# Patient Record
Sex: MALE | Race: BLACK OR AFRICAN AMERICAN
[De-identification: names, ages, dates, MRNs, and addresses within clinical notes are randomized per-mention and may not be internally consistent; named-entity substitution may affect disease eponyms.]

---

## 2018-01-11 ENCOUNTER — HOSPITAL ENCOUNTER (EMERGENCY)
Dept: HOSPITAL 61 - ER | Age: 48
Discharge: HOME | End: 2018-01-11
Payer: COMMERCIAL
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
## 2018-01-11 DIAGNOSIS — S46.212A: Primary | ICD-10-CM

## 2018-01-11 DIAGNOSIS — F32.9: ICD-10-CM

## 2018-01-11 PROCEDURE — 73060 X-RAY EXAM OF HUMERUS: CPT

## 2018-01-11 PROCEDURE — 99284 EMERGENCY DEPT VISIT MOD MDM: CPT

## 2018-01-11 RX ADMIN — IBUPROFEN 1 MG: 800 TABLET ORAL at 12:57

## 2018-01-19 ENCOUNTER — HOSPITAL ENCOUNTER (EMERGENCY)
Dept: HOSPITAL 61 - ER | Age: 48
Discharge: HOME | End: 2018-01-19
Payer: COMMERCIAL
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
## 2018-01-19 DIAGNOSIS — S49.91XA: Primary | ICD-10-CM
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
## 2018-01-19 PROCEDURE — 99284 EMERGENCY DEPT VISIT MOD MDM: CPT

## 2018-01-19 PROCEDURE — 73030 X-RAY EXAM OF SHOULDER: CPT

## 2018-01-19 RX ADMIN — IBUPROFEN 1 MG: 800 TABLET ORAL at 15:25

## 2019-02-25 ENCOUNTER — HOSPITAL ENCOUNTER (EMERGENCY)
Dept: HOSPITAL 61 - ER | Age: 49
Discharge: HOME | End: 2019-02-25
Payer: COMMERCIAL

## 2019-02-25 VITALS — SYSTOLIC BLOOD PRESSURE: 156 MMHG | DIASTOLIC BLOOD PRESSURE: 102 MMHG

## 2019-02-25 VITALS — WEIGHT: 235 LBS | HEIGHT: 69 IN | BODY MASS INDEX: 34.8 KG/M2
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
## 2019-02-25 DIAGNOSIS — S29.012A: Primary | ICD-10-CM
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
## 2019-02-25 LAB
APTT PPP: YELLOW S
BACTERIA #/AREA URNS HPF: 0 /HPF
BILIRUB UR QL STRIP: NEGATIVE
FIBRINOGEN PPP-MCNC: CLEAR MG/DL
NITRITE UR QL STRIP: NEGATIVE
PH UR STRIP: 5.5 [PH]
PROT UR STRIP-MCNC: NEGATIVE MG/DL
RBC #/AREA URNS HPF: (no result) /HPF (ref 0–2)
SQUAMOUS #/AREA URNS LPF: (no result) /LPF
UROBILINOGEN UR-MCNC: 0.2 MG/DL
WBC #/AREA URNS HPF: 0 /HPF (ref 0–4)

## 2019-02-25 PROCEDURE — 71046 X-RAY EXAM CHEST 2 VIEWS: CPT

## 2019-02-25 PROCEDURE — 81001 URINALYSIS AUTO W/SCOPE: CPT

## 2019-02-25 PROCEDURE — 99283 EMERGENCY DEPT VISIT LOW MDM: CPT

## 2019-02-25 PROCEDURE — 99284 EMERGENCY DEPT VISIT MOD MDM: CPT

## 2019-02-25 PROCEDURE — 96372 THER/PROPH/DIAG INJ SC/IM: CPT

## 2019-02-25 NOTE — RAD
CHEST PA   LATERAL

 

History: rt lower rib pain, no injury 

 

Comparison: None.

 

Findings: 

The cardiomediastinal silhouette is normal. Pulmonary vasculature is 

normal. The lungs are clear. No pleural effusion or pneumothorax is seen. 

There is no acute bone abnormality. 

 

IMPRESSION: 

No acute cardiopulmonary process. 

 

 

Electronically signed by: Neri Maher MD (2/25/2019 10:25 AM) [id number]

## 2019-02-25 NOTE — PHYS DOC
Past Medical History


Past Medical History:  Depression


Additional Past Medical Histor:  unknown "psych" dx,


Past Surgical History:  No Surgical History


Alcohol Use:  None


Drug Use:  Cocaine





Adult General


Chief Complaint


Chief Complaint:  FLANK PAIN





HPI


HPI





48-year-old male presents to ER via POV for complaints of right mid back pain 

for the past 3 days. Patient denies any recent falls or injury. Patient denies 

pain radiating to any other area. Patient denies urinary symptoms, fever or 

chills, flu like illness, SOA, or CP. Pt reports he is rt hand dominant and is 

a - working last night. Pt reports pain is constant and aching/tight 

in nature. He reports pain increases with repositioning/movements and palp. of 

area. He denies swelling/numbness or tingling. He denies recent travel.





Pt is daily smoker and drinks approx. 6 pk of beer daily- denies any etoh 

intake today.





Review of Systems


Review of Systems





Constitutional: Denies fever or chills. Denies weakness


Eyes: Denies change in visual acuity, redness, or eye pain []


HENT: Denies nasal congestion or sore throat []


Respiratory: Denies cough or shortness of breath []


Cardiovascular: Denies CP


GI: Denies abdominal pain, nausea, vomiting, bloody stools or diarrhea []


: Denies dysuria or hematuria. Denies incontinence of bowel/bladder


Musculoskeletal: Denies neck pain or joint pain. Reports rt side mid back pain- 

denies radiation of pain to extremities/lower back


Integument: Denies rash or skin lesions []


Neurologic: Denies headache, focal weakness or sensory changes []





All other systems were reviewed and found to be within normal limits, except as 

documented in this note.





Current Medications


Current Medications





Current Medications








 Medications


  (Trade)  Dose


 Ordered  Sig/Mary Beth  Start Time


 Stop Time Status Last Admin


Dose Admin


 


 Ketorolac


 Tromethamine


  (Toradol Im)  30 mg  1X  ONCE  2/25/19 09:15


 2/25/19 09:16 DC 2/25/19 09:29


30 MG


 


 Lidocaine


  (Lidoderm)  1 patch  1X  ONCE  2/25/19 09:15


 2/25/19 09:16 DC 2/25/19 09:28


1 PATCH


 


 Methocarbamol


  (Robaxin)  750 mg  1X  ONCE  2/25/19 09:15


 2/25/19 09:16 DC 2/25/19 09:28


750 MG











Allergies


Allergies





Allergies








Coded Allergies Type Severity Reaction Last Updated Verified


 


  No Known Drug Allergies    4/3/16 No











Physical Exam


Physical Exam





Constitutional: Well developed, well nourished, no acute distress, non-toxic 

appearance. Steady unassisted gait


HENT: Normocephalic, atraumatic, oropharynx moist, nose normal. []


Eyes: Pupils equal, conjunctiva normal, no discharge. [] 


Neck: Normal range of motion, no tenderness, supple, no stridor. [] 


Cardiovascular: Heart rate regular rhythm, no murmur []


Lungs & Thorax:  Bilateral breath sounds clear to auscultation. Resp. equal/

nonlabored


Abdomen: Bowel sounds normal, soft, no tenderness


Skin: Warm, dry, no erythema, no rash. [] 


Back: Tender on palp. mid rt side back- no crepitus/visible injury/swelling- pt 

has facial grimacing with repositioning and with palp. of area, no CVA 

tenderness. [] 


Extremities: No tenderness, no cyanosis, no clubbing, ROM intact, no edema. 2+ 

radial bilat. 


Neurologic: Alert and oriented X 3, normal motor function, normal sensory 

function, no focal deficits noted. []


Psychologic: Affect normal, judgement normal, mood normal. []





Current Patient Data


Vital Signs





 Vital Signs








  Date Time  Temp Pulse Resp B/P (MAP) Pulse Ox O2 Delivery O2 Flow Rate FiO2


 


2/25/19 08:57 97.6 64 18 149/99 (116) 98 Room Air  





 97.6       








Lab Values





 Laboratory Tests








Test


 2/25/19


08:58


 


Urine Collection Type Void  


 


Urine Color Yellow  


 


Urine Clarity Clear  


 


Urine pH 5.5  


 


Urine Specific Gravity 1.020  


 


Urine Protein


 Negative mg/dL


(NEG-TRACE)


 


Urine Glucose (UA)


 Negative mg/dL


(NEG)


 


Urine Ketones (Stick)


 Negative mg/dL


(NEG)


 


Urine Blood


 Negative (NEG)





 


Urine Nitrite


 Negative (NEG)





 


Urine Bilirubin


 Negative (NEG)





 


Urine Urobilinogen Dipstick


 0.2 mg/dL (0.2


mg/dL)


 


Urine Leukocyte Esterase


 Negative (NEG)





 


Urine RBC


 Occ /HPF (0-2)





 


Urine WBC 0 /HPF (0-4)  


 


Urine Squamous Epithelial


Cells None /LPF  





 


Urine Bacteria


 0 /HPF (0-FEW)





 


Urine Mucus Slight /LPF  











EKG


EKG


[]





Radiology/Procedures


Radiology/Procedures


PROCEDURE: CHEST PA & LATERAL





CHEST PA   LATERAL


 


History: rt lower rib pain, no injury 


 


Comparison: None.


 


Findings: 


The cardiomediastinal silhouette is normal. Pulmonary vasculature is 


normal. The lungs are clear. No pleural effusion or pneumothorax is seen. 


There is no acute bone abnormality. 


 


IMPRESSION: 


No acute cardiopulmonary process. 


 


 


Electronically signed by: Neri Gonzalez MD (2/25/2019 10:25 AM) [id number]














DICTATED and SIGNED BY:     NERI GONZALEZ MD


DATE:     02/25/19 1021





Course & Med Decision Making


Course & Med Decision Making


Pertinent Labs and Imaging studies reviewed. (See chart for details)





Patient was evaluated in the ER for complaints of right mid back pain which has 

been ongoing for 3 days- denying any injury. Patient had chest x-ray which was 

negative for acute findings and UA negative for infection, blood, ketones. 

Patient was treated with dose of Toradol and oral Robaxin. Patient had Lidoderm 

patch applied to focal area of tenderness on the right side mid back. Patient 

reports he has had some improvement in pain. He remains PMS intact all 

extremities with full range of motion and steady unassisted gait. Pt reported 

he been off of his blood pressure medicine for quite a while and so discussion 

had with patient regarding follow-up with primary care physician for recheck of 

blood pressure and if symptoms persist. BP was 149/99 while in the ER. Patient 

will be provided with community clinic and physician referral information for 

follow-up purposes. Will provide prescription for Robaxin patient educated on 

medication. Discharge instructions were discussed and education provided on 

signs and symptoms to return to ER for. Patient was advised not to drink 

alcohol if taking Robaxin prescription as well as not to drive. At time of 

discharge discussion patient was in no visible distress.





Dragon Disclaimer


Dragon Disclaimer


This electronic medical record was generated, in whole or in part, using a 

voice recognition dictation system.





Departure


Departure


Impression:  


 Primary Impression:  


 Muscle strain of right upper back


 Additional Impression:  


 Musculoskeletal back pain


Disposition:  01 HOME, SELF-CARE


Condition:  STABLE


Referrals:  


NO PCP (PCP)


Patient Instructions:  Muscle Strain, Musculoskeletal Pain





Additional Instructions:  


Ibuprofen and/or Tylenol as needed for pain as directed on container. Sports 

cream as directed on container as needed. Epsom salt soaks as directed on 

container may also help with pain. Remove the lidocaine patch applied in the ER 

in 12 hours- over the counter patches can be used as directed on container. 





Ice and heat compress to affected area every 3-4 hours every 20-30 minutes.





If taking Robaxin prescription no driving or drinking alcohol.





If symptoms persist follow-up with primary doctor for re-evaluation.





You should have your blood pressure rechecked by your doctor as you have been 

off of your blood pressure medication for awhile. Your blood pressure was 149/

99 while in the ER.


Scripts


Methocarbamol (ROBAXIN-750) 750 Mg Tablet


1 TAB PO BID PRN for PAIN, #8 TAB 0 Refills


   No drinking alcohol or driving if taking this medication


   Prov: ALEXANDRIA MOSS         2/25/19





Problem Qualifiers











ALEXANDRIA MOSS Feb 25, 2019 09:15

## 2019-04-22 ENCOUNTER — HOSPITAL ENCOUNTER (EMERGENCY)
Dept: HOSPITAL 61 - ER | Age: 49
Discharge: HOME | End: 2019-04-22
Payer: COMMERCIAL

## 2019-04-22 VITALS — WEIGHT: 235 LBS | BODY MASS INDEX: 34.8 KG/M2 | HEIGHT: 69 IN

## 2019-04-22 VITALS — DIASTOLIC BLOOD PRESSURE: 93 MMHG | SYSTOLIC BLOOD PRESSURE: 141 MMHG

## 2019-04-22 DIAGNOSIS — M71.22: ICD-10-CM
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
## 2019-04-22 DIAGNOSIS — M25.462: ICD-10-CM
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
## 2019-04-22 DIAGNOSIS — S80.12XA: Primary | ICD-10-CM

## 2019-04-22 DIAGNOSIS — F10.20: ICD-10-CM

## 2019-04-22 DIAGNOSIS — I10: ICD-10-CM
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
## 2019-04-22 LAB
ANION GAP SERPL CALC-SCNC: 12 MMOL/L (ref 6–14)
APTT BLD: 31 SEC (ref 24–38)
BASOPHILS # BLD AUTO: 0.1 X10^3/UL (ref 0–0.2)
BASOPHILS NFR BLD: 1 % (ref 0–3)
BUN SERPL-MCNC: 21 MG/DL (ref 8–26)
CALCIUM SERPL-MCNC: 9.2 MG/DL (ref 8.5–10.1)
CHLORIDE SERPL-SCNC: 103 MMOL/L (ref 98–107)
CK SERPL-CCNC: 297 U/L (ref 39–308)
CO2 SERPL-SCNC: 22 MMOL/L (ref 21–32)
CREAT SERPL-MCNC: 1.5 MG/DL (ref 0.7–1.3)
EOSINOPHIL NFR BLD: 0.2 X10^3/UL (ref 0–0.7)
EOSINOPHIL NFR BLD: 2 % (ref 0–3)
ERYTHROCYTE [DISTWIDTH] IN BLOOD BY AUTOMATED COUNT: 14.1 % (ref 11.5–14.5)
GFR SERPLBLD BASED ON 1.73 SQ M-ARVRAT: 60.2 ML/MIN
GLUCOSE SERPL-MCNC: 105 MG/DL (ref 70–99)
HCT VFR BLD CALC: 38 % (ref 39–53)
HGB BLD-MCNC: 12.7 G/DL (ref 13–17.5)
LYMPHOCYTES # BLD: 3.1 X10^3/UL (ref 1–4.8)
LYMPHOCYTES NFR BLD AUTO: 35 % (ref 24–48)
MCH RBC QN AUTO: 30 PG (ref 25–35)
MCHC RBC AUTO-ENTMCNC: 33 G/DL (ref 31–37)
MCV RBC AUTO: 91 FL (ref 79–100)
MONO #: 1 X10^3/UL (ref 0–1.1)
MONOCYTES NFR BLD: 12 % (ref 0–9)
NEUT #: 4.4 X10^3UL (ref 1.8–7.7)
NEUTROPHILS NFR BLD AUTO: 50 % (ref 31–73)
PLATELET # BLD AUTO: 252 X10^3/UL (ref 140–400)
POTASSIUM SERPL-SCNC: 3.7 MMOL/L (ref 3.5–5.1)
PROTHROMBIN TIME: 13.2 SEC (ref 11.7–14)
RBC # BLD AUTO: 4.18 X10^6/UL (ref 4.3–5.7)
SODIUM SERPL-SCNC: 137 MMOL/L (ref 136–145)
WBC # BLD AUTO: 8.8 X10^3/UL (ref 4–11)

## 2019-04-22 PROCEDURE — 85610 PROTHROMBIN TIME: CPT

## 2019-04-22 PROCEDURE — 85025 COMPLETE CBC W/AUTO DIFF WBC: CPT

## 2019-04-22 PROCEDURE — 73700 CT LOWER EXTREMITY W/O DYE: CPT

## 2019-04-22 PROCEDURE — 36415 COLL VENOUS BLD VENIPUNCTURE: CPT

## 2019-04-22 PROCEDURE — 80048 BASIC METABOLIC PNL TOTAL CA: CPT

## 2019-04-22 PROCEDURE — 93971 EXTREMITY STUDY: CPT

## 2019-04-22 PROCEDURE — 85730 THROMBOPLASTIN TIME PARTIAL: CPT

## 2019-04-22 PROCEDURE — 82550 ASSAY OF CK (CPK): CPT

## 2019-04-22 NOTE — RAD
Left leg venous Doppler study:

 

Clinical indications: Left calf swelling and pain.

 

Findings: Duplex sonography (including gray scale evaluation and color 

flow and waveform spectral analysis) of the proximal aspect of the greater

saphenous vein and the proximal aspect of the profunda femoral vein and 

the entire length of the common femoral and superficial femoral and 

popliteal veins and the tibioperoneal trunk and the proximal aspect of the

posterior tibial and peroneal veins of the left leg was performed. Normal 

compressibility, augmentation of color Doppler flow after calf 

compression, and respiratory variation of Doppler flow is seen. Thus, 

there are no sonographic findings of deep venous thrombosis within these 

veins.

 

Impression: There are no sonographic findings of deep venous thrombosis 

within the veins discussed above of the left lower extremity.

 

There is a large complex heterogeneous cystic and solid lesion within the 

popliteal fossa extending into the proximal calf measuring 13 cm x 4.6 cm 

x 1.9 cm in size. This is avascular and could represent a hematoma.

 

Electronically signed by: Jaison Lang MD (4/22/2019 4:54 PM) [id number]

## 2019-04-22 NOTE — PHYS DOC
Past Medical History


Past Medical History:  Depression, Hypertension, Other


Additional Past Medical Histor:  unknown "psych" dx,


Past Surgical History:  No Surgical History


Alcohol Use:  Heavy


Drug Use:  Cocaine





Adult General


Chief Complaint


Chief Complaint:  LOWER EXTREMITY SWELLING





HPI


HPI





Patient is a 48  year old male presents to the ED complaining of left lower leg 

swelling 4 days ago. Patient states that he feels like he was bit by something.

States he thinks it was on the back of his calf but states no bite marks. States

he started to notice swelling 4 days ago. Describes the pain as achy. Rates the 

pain as 6 out of 10. Denies injury, erythema, redness, warmth, fever, 

nausea/vomiting, chest pain, shortness of breath, paresthesias, weakness, 

dizziness or recent travel.





Review of Systems


Review of Systems





Constitutional: Denies fever or chills []


Eyes: Denies change in visual acuity, redness, or eye pain []


HENT: Denies nasal congestion or sore throat []


Respiratory: Denies cough or shortness of breath []


Cardiovascular: No additional information not addressed in HPI []


GI: Denies abdominal pain, nausea, vomiting, bloody stools or diarrhea []


: Denies dysuria or hematuria []


Musculoskeletal: Complains of left calf pain. Denies back pain.


Integument: Denies rash or skin lesions []


Neurologic: Denies headache, focal weakness or sensory changes []





All other systems were reviewed and found to be within normal limits, except as 

documented in this note.





Allergies


Allergies





Allergies








Coded Allergies Type Severity Reaction Last Updated Verified


 


  No Known Drug Allergies    4/3/16 No











Physical Exam


Physical Exam





Constitutional: Well developed, well nourished, no acute distress, non-toxic 

appearance. []


HENT: Normocephalic, atraumatic


Cardiovascular:Heart rate regular rhythm, no murmur []


Lungs & Thorax:  Bilateral breath sounds clear to auscultation []


Abdomen: Bowel sounds normal, soft, no tenderness, no masses, no pulsatile 

masses. [] 


Skin: Warm, dry, no erythema, no rash. [] 


Back: No tenderness, no CVA tenderness. [] 


Extremities: mild left calf tenderness/swelling, No overlying skin changes. no 

cyanosis, no clubbing, ROM intact, no edema. [] 


Neurologic: Alert and oriented X 3, normal motor function, normal sensory 

function, no focal deficits noted. []


Psychologic: Affect normal, judgement normal, mood normal. []





Current Patient Data


Vital Signs





                                   Vital Signs








  Date Time  Temp Pulse Resp B/P (MAP) Pulse Ox O2 Delivery O2 Flow Rate FiO2


 


4/22/19 16:19 97.8 79 18 139/93 (108) 98 Room Air  





 97.8       











EKG


EKG


[]





Radiology/Procedures


Radiology/Procedures


PROCEDURE: VENOUS LOWER EXTREMITY LEFT





 


Left leg venous Doppler study:


 


Clinical indications: Left calf swelling and pain.


 


Findings: Duplex sonography (including gray scale evaluation and color 


flow and waveform spectral analysis) of the proximal aspect of the greater


saphenous vein and the proximal aspect of the profunda femoral vein and 


the entire length of the common femoral and superficial femoral and 


popliteal veins and the tibioperoneal trunk and the proximal aspect of the


posterior tibial and peroneal veins of the left leg was performed. Normal 


compressibility, augmentation of color Doppler flow after calf 


compression, and respiratory variation of Doppler flow is seen. Thus, 


there are no sonographic findings of deep venous thrombosis within these 


veins.


 


Impression: There are no sonographic findings of deep venous thrombosis 


within the veins discussed above of the left lower extremity.


 


There is a large complex heterogeneous cystic and solid lesion within the 


popliteal fossa extending into the proximal calf measuring 13 cm x 4.6 cm 


x 1.9 cm in size. This is avascular and could represent a hematoma.[]





Course & Med Decision Making


Course & Med Decision Making


Pertinent Labs and Imaging studies reviewed. (See chart for details)





[]Discussed US findings with patient. Lab and additional imaging ordered and 

pending. Transfer of care to LAURA Robison. Will await pending labs/imaging

 and disposition patient.





Dragon Disclaimer


Dragon Disclaimer


This electronic medical record was generated, in whole or in part, using a voice

 recognition dictation system.





Departure


Departure


Impression:  


   Primary Impression:  


   Spontaneous hematoma of lower leg


Referrals:  


NO PCP (PCP)











NGOZI GILLIAM        Apr 22, 2019 16:14

## 2019-04-22 NOTE — RAD
CT left lower extremity without contrast

 

HISTORY: Large hematoma

 

Axial helical images obtained of the left lower extremity from the distal 

thigh through the foot without IV contrast. Axial coronal and sagittal 

reconstruction was performed.

 

There is a moderate joint effusion. There is a complex Baker's cyst that 

measures 6.3 x 6.9 x 2.7 cm and contains multiple noncalcified loose 

bodies.

 

There is diffuse soft tissue edema. There is no other fluid collection.

 

IMPRESSION:

 

1. Large complex popliteal cyst which appears to be a Baker's cyst. There 

are several loose bodies and organizing hemorrhage is possible.

2. Moderate knee effusion.

 

Electronically signed by: Gallo Bose III, MD (4/22/2019 5:42 PM) [facility]

## 2019-08-05 ENCOUNTER — HOSPITAL ENCOUNTER (EMERGENCY)
Dept: HOSPITAL 61 - ER | Age: 49
Discharge: HOME | End: 2019-08-05
Payer: COMMERCIAL

## 2019-08-05 VITALS — WEIGHT: 240 LBS | BODY MASS INDEX: 35.55 KG/M2 | HEIGHT: 69 IN

## 2019-08-05 VITALS — SYSTOLIC BLOOD PRESSURE: 152 MMHG | DIASTOLIC BLOOD PRESSURE: 92 MMHG

## 2019-08-05 DIAGNOSIS — S61.011A: Primary | ICD-10-CM
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
## 2019-08-05 DIAGNOSIS — I10: ICD-10-CM

## 2019-08-05 DIAGNOSIS — F10.20: ICD-10-CM
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
## 2019-08-05 PROCEDURE — 99283 EMERGENCY DEPT VISIT LOW MDM: CPT

## 2019-08-05 PROCEDURE — 90715 TDAP VACCINE 7 YRS/> IM: CPT

## 2019-08-05 PROCEDURE — 90471 IMMUNIZATION ADMIN: CPT

## 2019-08-05 NOTE — PHYS DOC
Past Medical History


Past Medical History:  Depression, Hypertension, Other


Additional Past Medical Histor:  unknown "psych" dx,


Past Surgical History:  No Surgical History


Alcohol Use:  Heavy


Drug Use:  Cocaine





Adult General


Chief Complaint


Chief Complaint:  LACERATION/AVULSION





HPI


HPI





Patient is a 48  year old since emergency department with complaints of a 

laceration to his right thumb. Patient states he was working at [employer] using a  to slice some brisk in the accidentally sliced the 

tip of his right thumb. Unsure when his last tetanus shot was. He currently 

rates the pain 7 out of 10 on the pain scale, he denies any alleviating factors.

The pain increases when he moves or touches the area.





ROS


Patient denies any fever, cough, nausea, vomiting, diarrhea, headache, numbness,

tingling, or weakness of the affected extremity. All other ROS is neg unless 

otherwise noted in HPI.





Review of Systems


Review of Systems


See Above





Current Medications


Current Medications





Current Medications








 Medications


  (Trade)  Dose


 Ordered  Sig/Mary Beth  Start Time


 Stop Time Status Last Admin


Dose Admin


 


 Acetaminophen/


 Hydrocodone Bitart


  (Lortab 5/325)  1 tab  1X  ONCE  8/5/19 16:15


 8/5/19 16:15 DC 8/5/19 16:11


1 TAB


 


 Cellulose


  (Surgicel


 Hemostat 4x8)  1 each  1X  ONCE  8/5/19 15:45


 8/5/19 15:46 DC 8/5/19 15:18


1 EACH


 


 Diphtheria/


 Tetanus/Acell


 Pertussis


  (Boostrix)  0.5 ml  ONCE ONCE  8/5/19 15:15


 8/5/19 15:16 DC 8/5/19 14:46


0.5 ML











Allergies


Allergies





Allergies








Coded Allergies Type Severity Reaction Last Updated Verified


 


  No Known Drug Allergies    4/3/16 No











Physical Exam


Physical Exam


See Above


Constitutional: Well developed, well nourished, no acute distress, non-toxic 

appearance. []


HENT: Normocephalic, atraumatic, bilateral external ears normal, nose normal. []


Eyes: PERRLA, conjunctiva normal, no discharge. [] 


Neck: Normal range of motion, no stridor. [] 


Cardiovascular:Heart rate regular rhythm


Lungs & Thorax: Respirations even and unlabored, no retractions, no respiratory 

distress


Skin: Warm, dry, no erythema, no rash; skin avulsion to medial tip of left 

thumb, bleeding controlled with pressure applied.. [] 


Extremities: No cyanosis, no clubbing, no edema; full extension and flexion of 

right thumb, no bony tenderness to palpation, no deformity


Neurologic: Alert and oriented X 3, normal motor function, normal sensory 

function, no focal deficits noted. []


Psychologic: Affect normal, judgement normal, mood normal. []





Current Patient Data


Vital Signs





                                   Vital Signs








  Date Time  Temp Pulse Resp B/P (MAP) Pulse Ox O2 Delivery O2 Flow Rate FiO2


 


8/5/19 16:11   16  99 Room Air  


 


8/5/19 14:26 98.6 91  152/92 (112)    





 98.6       











EKG


EKG


[]





Radiology/Procedures


Radiology/Procedures


Surgicel was applied over the avulsion site of the right thumb, a pressure 

bandage with Kerlix and Coban was applied, the pressure bandage was removed 30 

minutes after it was applied, the site was no longer bleeding. Pressure dressing

 with Kerlix and Coban were reapplied to the site. Patient was given dressing 

instructions by this NP.





Course & Med Decision Making


Course & Med Decision Making


Pertinent Labs and Imaging studies reviewed. (See chart for details)





[]





Dragon Disclaimer


Dragon Disclaimer


This electronic medical record was generated, in whole or in part, using a voice

 recognition dictation system.





Departure


Departure


Impression:  


   Primary Impression:  


   Avulsion of skin of right thumb


   Additional Impression:  


   Need for Tdap vaccination


Disposition:  01 HOME, SELF-CARE


Condition:  STABLE


Referrals:  


NO PCP (PCP)


Patient Instructions:  Deep Skin Avulsion, VIS, Tetanus, Diphtheria (Td); 

Tetanus, Diphtheria, Pertussis (Tdap) - CDC





Additional Instructions:  


Keep the area clean and dry. Follow up with your primary care doctor as needed. 

Return to the ER if symptoms worsen.





Problem Qualifiers








   Primary Impression:  


   Avulsion of skin of right thumb


   Encounter type:  initial encounter  Qualified Codes:  S61.001A - Unspecified 

   open wound of right thumb without damage to nail, initial encounter








MALORIE SYLVESTER APRN        Aug 5, 2019 15:56